# Patient Record
Sex: FEMALE | Race: WHITE | NOT HISPANIC OR LATINO | Employment: OTHER | URBAN - METROPOLITAN AREA
[De-identification: names, ages, dates, MRNs, and addresses within clinical notes are randomized per-mention and may not be internally consistent; named-entity substitution may affect disease eponyms.]

---

## 2017-07-07 ENCOUNTER — TRANSCRIBE ORDERS (OUTPATIENT)
Dept: LAB | Facility: CLINIC | Age: 67
End: 2017-07-07

## 2017-07-07 ENCOUNTER — APPOINTMENT (OUTPATIENT)
Dept: LAB | Facility: CLINIC | Age: 67
End: 2017-07-07
Payer: MEDICARE

## 2017-07-07 DIAGNOSIS — I10 ESSENTIAL HYPERTENSION, MALIGNANT: Primary | ICD-10-CM

## 2017-07-07 DIAGNOSIS — E11.65 UNCONTROLLED TYPE 2 DIABETES MELLITUS WITH HYPERGLYCEMIA, UNSPECIFIED LONG TERM INSULIN USE STATUS: ICD-10-CM

## 2017-07-07 DIAGNOSIS — I10 ESSENTIAL HYPERTENSION, MALIGNANT: ICD-10-CM

## 2017-07-07 LAB
ALBUMIN SERPL BCP-MCNC: 3 G/DL (ref 3.5–5)
ALP SERPL-CCNC: 142 U/L (ref 46–116)
ALT SERPL W P-5'-P-CCNC: 14 U/L (ref 12–78)
ANION GAP SERPL CALCULATED.3IONS-SCNC: 5 MMOL/L (ref 4–13)
AST SERPL W P-5'-P-CCNC: 8 U/L (ref 5–45)
BASOPHILS # BLD AUTO: 0.07 THOUSANDS/ΜL (ref 0–0.1)
BASOPHILS NFR BLD AUTO: 1 % (ref 0–1)
BILIRUB SERPL-MCNC: 0.28 MG/DL (ref 0.2–1)
BUN SERPL-MCNC: 31 MG/DL (ref 5–25)
CALCIUM SERPL-MCNC: 9.2 MG/DL (ref 8.3–10.1)
CHLORIDE SERPL-SCNC: 110 MMOL/L (ref 100–108)
CHOLEST SERPL-MCNC: 171 MG/DL (ref 50–200)
CO2 SERPL-SCNC: 24 MMOL/L (ref 21–32)
CREAT SERPL-MCNC: 1.53 MG/DL (ref 0.6–1.3)
CREAT UR-MCNC: 97.3 MG/DL
EOSINOPHIL # BLD AUTO: 0.87 THOUSAND/ΜL (ref 0–0.61)
EOSINOPHIL NFR BLD AUTO: 8 % (ref 0–6)
ERYTHROCYTE [DISTWIDTH] IN BLOOD BY AUTOMATED COUNT: 14.6 % (ref 11.6–15.1)
EST. AVERAGE GLUCOSE BLD GHB EST-MCNC: 203 MG/DL
GFR SERPL CREATININE-BSD FRML MDRD: 33.8 ML/MIN/1.73SQ M
GLUCOSE P FAST SERPL-MCNC: 107 MG/DL (ref 65–99)
HBA1C MFR BLD: 8.7 % (ref 4.2–6.3)
HCT VFR BLD AUTO: 33.6 % (ref 34.8–46.1)
HDLC SERPL-MCNC: 43 MG/DL (ref 40–60)
HGB BLD-MCNC: 10.7 G/DL (ref 11.5–15.4)
LDLC SERPL CALC-MCNC: 104 MG/DL (ref 0–100)
LYMPHOCYTES # BLD AUTO: 1.79 THOUSANDS/ΜL (ref 0.6–4.47)
LYMPHOCYTES NFR BLD AUTO: 16 % (ref 14–44)
MCH RBC QN AUTO: 29.2 PG (ref 26.8–34.3)
MCHC RBC AUTO-ENTMCNC: 31.8 G/DL (ref 31.4–37.4)
MCV RBC AUTO: 92 FL (ref 82–98)
MICROALBUMIN UR-MCNC: 1470 MG/L (ref 0–20)
MICROALBUMIN/CREAT 24H UR: 1511 MG/G CREATININE (ref 0–30)
MONOCYTES # BLD AUTO: 0.8 THOUSAND/ΜL (ref 0.17–1.22)
MONOCYTES NFR BLD AUTO: 7 % (ref 4–12)
NEUTROPHILS # BLD AUTO: 7.39 THOUSANDS/ΜL (ref 1.85–7.62)
NEUTS SEG NFR BLD AUTO: 68 % (ref 43–75)
NRBC BLD AUTO-RTO: 0 /100 WBCS
PLATELET # BLD AUTO: 358 THOUSANDS/UL (ref 149–390)
PMV BLD AUTO: 10 FL (ref 8.9–12.7)
POTASSIUM SERPL-SCNC: 4.8 MMOL/L (ref 3.5–5.3)
PROT SERPL-MCNC: 8.7 G/DL (ref 6.4–8.2)
RBC # BLD AUTO: 3.66 MILLION/UL (ref 3.81–5.12)
SODIUM SERPL-SCNC: 139 MMOL/L (ref 136–145)
TRIGL SERPL-MCNC: 122 MG/DL
TSH SERPL DL<=0.05 MIU/L-ACNC: 2.25 UIU/ML (ref 0.36–3.74)
WBC # BLD AUTO: 10.96 THOUSAND/UL (ref 4.31–10.16)

## 2017-07-07 PROCEDURE — 82043 UR ALBUMIN QUANTITATIVE: CPT

## 2017-07-07 PROCEDURE — 84443 ASSAY THYROID STIM HORMONE: CPT

## 2017-07-07 PROCEDURE — 83036 HEMOGLOBIN GLYCOSYLATED A1C: CPT

## 2017-07-07 PROCEDURE — 85025 COMPLETE CBC W/AUTO DIFF WBC: CPT

## 2017-07-07 PROCEDURE — 80053 COMPREHEN METABOLIC PANEL: CPT

## 2017-07-07 PROCEDURE — 82570 ASSAY OF URINE CREATININE: CPT

## 2017-07-07 PROCEDURE — 36415 COLL VENOUS BLD VENIPUNCTURE: CPT

## 2017-07-07 PROCEDURE — 80061 LIPID PANEL: CPT

## 2017-07-11 ENCOUNTER — APPOINTMENT (OUTPATIENT)
Dept: WOUND CARE | Facility: HOSPITAL | Age: 67
End: 2017-07-11
Payer: MEDICARE

## 2017-07-11 PROCEDURE — 97597 DBRDMT OPN WND 1ST 20 CM/<: CPT | Performed by: PREVENTIVE MEDICINE

## 2017-07-11 PROCEDURE — 97598 DBRDMT OPN WND ADDL 20CM/<: CPT | Performed by: PREVENTIVE MEDICINE

## 2017-07-11 PROCEDURE — 99204 OFFICE O/P NEW MOD 45 MIN: CPT | Performed by: PREVENTIVE MEDICINE

## 2017-07-13 ENCOUNTER — TRANSCRIBE ORDERS (OUTPATIENT)
Dept: ADMINISTRATIVE | Facility: HOSPITAL | Age: 67
End: 2017-07-13

## 2017-07-13 DIAGNOSIS — I87.309 CHRONIC VENOUS HYPERTENSION, UNSPECIFIED LATERALITY: Primary | ICD-10-CM

## 2017-07-20 ENCOUNTER — APPOINTMENT (OUTPATIENT)
Dept: WOUND CARE | Facility: HOSPITAL | Age: 67
End: 2017-07-20
Payer: MEDICARE

## 2017-07-20 PROCEDURE — 97598 DBRDMT OPN WND ADDL 20CM/<: CPT

## 2017-07-20 PROCEDURE — 97597 DBRDMT OPN WND 1ST 20 CM/<: CPT

## 2017-07-27 ENCOUNTER — HOSPITAL ENCOUNTER (OUTPATIENT)
Dept: NON INVASIVE DIAGNOSTICS | Facility: CLINIC | Age: 67
Discharge: HOME | End: 2017-07-27
Payer: MEDICARE

## 2017-07-27 DIAGNOSIS — I87.309 CHRONIC VENOUS HYPERTENSION, UNSPECIFIED LATERALITY: ICD-10-CM

## 2017-07-27 PROCEDURE — 93925 LOWER EXTREMITY STUDY: CPT

## 2017-07-27 PROCEDURE — 93970 EXTREMITY STUDY: CPT

## 2017-07-27 PROCEDURE — 93923 UPR/LXTR ART STDY 3+ LVLS: CPT

## 2017-08-03 ENCOUNTER — APPOINTMENT (OUTPATIENT)
Dept: WOUND CARE | Facility: HOSPITAL | Age: 67
End: 2017-08-03
Payer: MEDICARE

## 2017-08-03 PROCEDURE — 97597 DBRDMT OPN WND 1ST 20 CM/<: CPT | Performed by: PREVENTIVE MEDICINE

## 2017-08-03 PROCEDURE — 97598 DBRDMT OPN WND ADDL 20CM/<: CPT | Performed by: PREVENTIVE MEDICINE

## 2017-08-10 ENCOUNTER — APPOINTMENT (OUTPATIENT)
Dept: WOUND CARE | Facility: HOSPITAL | Age: 67
End: 2017-08-10
Payer: MEDICARE

## 2017-08-10 PROCEDURE — 97597 DBRDMT OPN WND 1ST 20 CM/<: CPT | Performed by: PREVENTIVE MEDICINE

## 2017-08-17 ENCOUNTER — APPOINTMENT (OUTPATIENT)
Dept: WOUND CARE | Facility: HOSPITAL | Age: 67
End: 2017-08-17
Payer: MEDICARE

## 2017-08-17 PROCEDURE — 97597 DBRDMT OPN WND 1ST 20 CM/<: CPT | Performed by: PREVENTIVE MEDICINE

## 2017-08-24 ENCOUNTER — APPOINTMENT (OUTPATIENT)
Dept: WOUND CARE | Facility: HOSPITAL | Age: 67
End: 2017-08-24
Payer: MEDICARE

## 2017-08-24 PROCEDURE — 29581 APPL MULTLAYER CMPRN SYS LEG: CPT

## 2017-08-31 ENCOUNTER — APPOINTMENT (OUTPATIENT)
Dept: WOUND CARE | Facility: HOSPITAL | Age: 67
End: 2017-08-31
Payer: MEDICARE

## 2017-08-31 PROCEDURE — 97597 DBRDMT OPN WND 1ST 20 CM/<: CPT

## 2017-09-07 ENCOUNTER — APPOINTMENT (OUTPATIENT)
Dept: WOUND CARE | Facility: HOSPITAL | Age: 67
End: 2017-09-07
Payer: MEDICARE

## 2017-09-07 PROCEDURE — 97597 DBRDMT OPN WND 1ST 20 CM/<: CPT | Performed by: PREVENTIVE MEDICINE

## 2017-09-14 ENCOUNTER — APPOINTMENT (OUTPATIENT)
Dept: WOUND CARE | Facility: HOSPITAL | Age: 67
End: 2017-09-14
Payer: MEDICARE

## 2017-09-14 PROCEDURE — 97597 DBRDMT OPN WND 1ST 20 CM/<: CPT | Performed by: PREVENTIVE MEDICINE

## 2017-09-21 ENCOUNTER — APPOINTMENT (OUTPATIENT)
Dept: WOUND CARE | Facility: HOSPITAL | Age: 67
End: 2017-09-21
Payer: MEDICARE

## 2017-09-21 PROCEDURE — 97597 DBRDMT OPN WND 1ST 20 CM/<: CPT | Performed by: PREVENTIVE MEDICINE

## 2017-09-28 ENCOUNTER — APPOINTMENT (OUTPATIENT)
Dept: WOUND CARE | Facility: HOSPITAL | Age: 67
End: 2017-09-28
Payer: MEDICARE

## 2017-09-28 PROCEDURE — 97597 DBRDMT OPN WND 1ST 20 CM/<: CPT | Performed by: PREVENTIVE MEDICINE

## 2017-10-06 ENCOUNTER — APPOINTMENT (OUTPATIENT)
Dept: WOUND CARE | Facility: HOSPITAL | Age: 67
End: 2017-10-06
Payer: MEDICARE

## 2017-10-06 PROCEDURE — 29581 APPL MULTLAYER CMPRN SYS LEG: CPT

## 2017-10-13 ENCOUNTER — APPOINTMENT (OUTPATIENT)
Dept: WOUND CARE | Facility: HOSPITAL | Age: 67
End: 2017-10-13
Payer: MEDICARE

## 2017-10-13 PROCEDURE — 29581 APPL MULTLAYER CMPRN SYS LEG: CPT | Performed by: PREVENTIVE MEDICINE

## 2017-10-19 ENCOUNTER — APPOINTMENT (OUTPATIENT)
Dept: WOUND CARE | Facility: HOSPITAL | Age: 67
End: 2017-10-19
Payer: MEDICARE

## 2017-10-19 PROCEDURE — 99212 OFFICE O/P EST SF 10 MIN: CPT | Performed by: PREVENTIVE MEDICINE

## 2018-04-24 ENCOUNTER — APPOINTMENT (OUTPATIENT)
Dept: LAB | Facility: CLINIC | Age: 68
End: 2018-04-24
Payer: MEDICARE

## 2018-04-24 ENCOUNTER — TRANSCRIBE ORDERS (OUTPATIENT)
Dept: LAB | Facility: CLINIC | Age: 68
End: 2018-04-24

## 2018-04-24 DIAGNOSIS — E11.8 DIABETIC COMPLICATION (HCC): ICD-10-CM

## 2018-04-24 DIAGNOSIS — I10 ESSENTIAL HYPERTENSION, MALIGNANT: Primary | ICD-10-CM

## 2018-04-24 DIAGNOSIS — E78.2 MIXED HYPERLIPIDEMIA: ICD-10-CM

## 2018-04-24 LAB
ALBUMIN SERPL BCP-MCNC: 3.2 G/DL (ref 3.5–5)
ALP SERPL-CCNC: 142 U/L (ref 46–116)
ALT SERPL W P-5'-P-CCNC: 13 U/L (ref 12–78)
ANION GAP SERPL CALCULATED.3IONS-SCNC: 8 MMOL/L (ref 4–13)
AST SERPL W P-5'-P-CCNC: 8 U/L (ref 5–45)
BASOPHILS # BLD AUTO: 0.09 THOUSANDS/ΜL (ref 0–0.1)
BASOPHILS NFR BLD AUTO: 1 % (ref 0–1)
BILIRUB SERPL-MCNC: 0.26 MG/DL (ref 0.2–1)
BUN SERPL-MCNC: 40 MG/DL (ref 5–25)
CALCIUM SERPL-MCNC: 9.5 MG/DL (ref 8.3–10.1)
CHLORIDE SERPL-SCNC: 111 MMOL/L (ref 100–108)
CHOLEST SERPL-MCNC: 208 MG/DL (ref 50–200)
CO2 SERPL-SCNC: 23 MMOL/L (ref 21–32)
CREAT SERPL-MCNC: 1.62 MG/DL (ref 0.6–1.3)
EOSINOPHIL # BLD AUTO: 1.08 THOUSAND/ΜL (ref 0–0.61)
EOSINOPHIL NFR BLD AUTO: 12 % (ref 0–6)
ERYTHROCYTE [DISTWIDTH] IN BLOOD BY AUTOMATED COUNT: 12.9 % (ref 11.6–15.1)
EST. AVERAGE GLUCOSE BLD GHB EST-MCNC: 197 MG/DL
GFR SERPL CREATININE-BSD FRML MDRD: 32 ML/MIN/1.73SQ M
GLUCOSE P FAST SERPL-MCNC: 118 MG/DL (ref 65–99)
HBA1C MFR BLD: 8.5 % (ref 4.2–6.3)
HCT VFR BLD AUTO: 37.5 % (ref 34.8–46.1)
HDLC SERPL-MCNC: 45 MG/DL (ref 40–60)
HGB BLD-MCNC: 11.4 G/DL (ref 11.5–15.4)
LDLC SERPL CALC-MCNC: 134 MG/DL (ref 0–100)
LYMPHOCYTES # BLD AUTO: 1.78 THOUSANDS/ΜL (ref 0.6–4.47)
LYMPHOCYTES NFR BLD AUTO: 20 % (ref 14–44)
MCH RBC QN AUTO: 29.5 PG (ref 26.8–34.3)
MCHC RBC AUTO-ENTMCNC: 30.4 G/DL (ref 31.4–37.4)
MCV RBC AUTO: 97 FL (ref 82–98)
MONOCYTES # BLD AUTO: 0.64 THOUSAND/ΜL (ref 0.17–1.22)
MONOCYTES NFR BLD AUTO: 7 % (ref 4–12)
NEUTROPHILS # BLD AUTO: 5.21 THOUSANDS/ΜL (ref 1.85–7.62)
NEUTS SEG NFR BLD AUTO: 60 % (ref 43–75)
NONHDLC SERPL-MCNC: 163 MG/DL
NRBC BLD AUTO-RTO: 0 /100 WBCS
PLATELET # BLD AUTO: 344 THOUSANDS/UL (ref 149–390)
PMV BLD AUTO: 10.3 FL (ref 8.9–12.7)
POTASSIUM SERPL-SCNC: 5.2 MMOL/L (ref 3.5–5.3)
PROT SERPL-MCNC: 9 G/DL (ref 6.4–8.2)
RBC # BLD AUTO: 3.86 MILLION/UL (ref 3.81–5.12)
SODIUM SERPL-SCNC: 142 MMOL/L (ref 136–145)
TRIGL SERPL-MCNC: 143 MG/DL
WBC # BLD AUTO: 8.87 THOUSAND/UL (ref 4.31–10.16)

## 2018-04-24 PROCEDURE — 36415 COLL VENOUS BLD VENIPUNCTURE: CPT

## 2018-04-24 PROCEDURE — 85025 COMPLETE CBC W/AUTO DIFF WBC: CPT

## 2018-04-24 PROCEDURE — 80053 COMPREHEN METABOLIC PANEL: CPT

## 2018-04-24 PROCEDURE — 83036 HEMOGLOBIN GLYCOSYLATED A1C: CPT

## 2018-04-24 PROCEDURE — 80061 LIPID PANEL: CPT

## 2018-06-28 ENCOUNTER — TRANSCRIBE ORDERS (OUTPATIENT)
Dept: LAB | Facility: CLINIC | Age: 68
End: 2018-06-28

## 2018-06-28 ENCOUNTER — APPOINTMENT (OUTPATIENT)
Dept: LAB | Facility: CLINIC | Age: 68
End: 2018-06-28
Payer: MEDICARE

## 2018-06-28 DIAGNOSIS — N18.4 CHRONIC KIDNEY DISEASE, STAGE IV (SEVERE) (HCC): Primary | ICD-10-CM

## 2018-06-28 LAB
ANION GAP SERPL CALCULATED.3IONS-SCNC: 9 MMOL/L (ref 4–13)
BUN SERPL-MCNC: 66 MG/DL (ref 5–25)
CALCIUM SERPL-MCNC: 9.4 MG/DL (ref 8.3–10.1)
CHLORIDE SERPL-SCNC: 104 MMOL/L (ref 100–108)
CO2 SERPL-SCNC: 24 MMOL/L (ref 21–32)
CREAT SERPL-MCNC: 2.73 MG/DL (ref 0.6–1.3)
GFR SERPL CREATININE-BSD FRML MDRD: 17 ML/MIN/1.73SQ M
GLUCOSE P FAST SERPL-MCNC: 232 MG/DL (ref 65–99)
POTASSIUM SERPL-SCNC: 4.9 MMOL/L (ref 3.5–5.3)
SODIUM SERPL-SCNC: 137 MMOL/L (ref 136–145)

## 2018-06-28 PROCEDURE — 36415 COLL VENOUS BLD VENIPUNCTURE: CPT

## 2018-06-28 PROCEDURE — 80048 BASIC METABOLIC PNL TOTAL CA: CPT

## 2018-07-06 ENCOUNTER — APPOINTMENT (OUTPATIENT)
Dept: LAB | Facility: CLINIC | Age: 68
End: 2018-07-06
Payer: MEDICARE

## 2018-07-06 ENCOUNTER — TRANSCRIBE ORDERS (OUTPATIENT)
Dept: LAB | Facility: CLINIC | Age: 68
End: 2018-07-06

## 2018-07-06 DIAGNOSIS — E78.5 HYPERLIPIDEMIA, UNSPECIFIED HYPERLIPIDEMIA TYPE: ICD-10-CM

## 2018-07-06 DIAGNOSIS — Z95.810 AICD (AUTOMATIC CARDIOVERTER/DEFIBRILLATOR) PRESENT: ICD-10-CM

## 2018-07-06 DIAGNOSIS — N18.4 CKD (CHRONIC KIDNEY DISEASE) STAGE 4, GFR 15-29 ML/MIN (HCC): ICD-10-CM

## 2018-07-06 DIAGNOSIS — I10 ESSENTIAL HYPERTENSION: ICD-10-CM

## 2018-07-06 DIAGNOSIS — I25.10 CORONARY ARTERY DISEASE INVOLVING NATIVE CORONARY ARTERY OF NATIVE HEART WITHOUT ANGINA PECTORIS: Primary | ICD-10-CM

## 2018-07-06 DIAGNOSIS — Z95.1 S/P CABG (CORONARY ARTERY BYPASS GRAFT): ICD-10-CM

## 2018-07-06 LAB
ANION GAP SERPL CALCULATED.3IONS-SCNC: 10 MMOL/L (ref 4–13)
BUN SERPL-MCNC: 57 MG/DL (ref 5–25)
CALCIUM SERPL-MCNC: 8.9 MG/DL (ref 8.3–10.1)
CHLORIDE SERPL-SCNC: 109 MMOL/L (ref 100–108)
CO2 SERPL-SCNC: 18 MMOL/L (ref 21–32)
CREAT SERPL-MCNC: 2.23 MG/DL (ref 0.6–1.3)
GFR SERPL CREATININE-BSD FRML MDRD: 22 ML/MIN/1.73SQ M
GLUCOSE P FAST SERPL-MCNC: 158 MG/DL (ref 65–99)
POTASSIUM SERPL-SCNC: 4.5 MMOL/L (ref 3.5–5.3)
SODIUM SERPL-SCNC: 137 MMOL/L (ref 136–145)

## 2018-07-06 PROCEDURE — 80048 BASIC METABOLIC PNL TOTAL CA: CPT

## 2018-07-06 PROCEDURE — 36415 COLL VENOUS BLD VENIPUNCTURE: CPT

## 2018-08-31 ENCOUNTER — APPOINTMENT (OUTPATIENT)
Dept: LAB | Facility: CLINIC | Age: 68
End: 2018-08-31
Payer: MEDICARE

## 2018-08-31 ENCOUNTER — TRANSCRIBE ORDERS (OUTPATIENT)
Dept: LAB | Facility: CLINIC | Age: 68
End: 2018-08-31

## 2018-08-31 DIAGNOSIS — N18.4 CHRONIC KIDNEY DISEASE, STAGE IV (SEVERE) (HCC): Primary | ICD-10-CM

## 2018-08-31 DIAGNOSIS — N18.4 CHRONIC KIDNEY DISEASE, STAGE IV (SEVERE) (HCC): ICD-10-CM

## 2018-08-31 DIAGNOSIS — I50.33 ACUTE ON CHRONIC DIASTOLIC HEART FAILURE (HCC): ICD-10-CM

## 2018-08-31 LAB
ANION GAP SERPL CALCULATED.3IONS-SCNC: 9 MMOL/L (ref 4–13)
BUN SERPL-MCNC: 65 MG/DL (ref 5–25)
CALCIUM SERPL-MCNC: 9.3 MG/DL (ref 8.3–10.1)
CHLORIDE SERPL-SCNC: 104 MMOL/L (ref 100–108)
CO2 SERPL-SCNC: 26 MMOL/L (ref 21–32)
CREAT SERPL-MCNC: 2.58 MG/DL (ref 0.6–1.3)
GFR SERPL CREATININE-BSD FRML MDRD: 18 ML/MIN/1.73SQ M
GLUCOSE P FAST SERPL-MCNC: 163 MG/DL (ref 65–99)
POTASSIUM SERPL-SCNC: 3.5 MMOL/L (ref 3.5–5.3)
SODIUM SERPL-SCNC: 139 MMOL/L (ref 136–145)

## 2018-08-31 PROCEDURE — 80048 BASIC METABOLIC PNL TOTAL CA: CPT

## 2018-08-31 PROCEDURE — 36415 COLL VENOUS BLD VENIPUNCTURE: CPT

## 2019-04-22 RX ORDER — CARVEDILOL 25 MG/1
TABLET ORAL
Qty: 60 TABLET | Refills: 2 | OUTPATIENT
Start: 2019-04-22

## 2020-07-03 ENCOUNTER — LAB REQUISITION (OUTPATIENT)
Dept: LAB | Facility: HOSPITAL | Age: 70
End: 2020-07-03
Payer: MEDICARE

## 2020-07-03 DIAGNOSIS — N18.9 CHRONIC KIDNEY DISEASE, UNSPECIFIED: ICD-10-CM

## 2020-07-03 DIAGNOSIS — R60.9 EDEMA, UNSPECIFIED: ICD-10-CM

## 2020-07-03 LAB
ANION GAP SERPL CALCULATED.3IONS-SCNC: 9 MMOL/L (ref 4–13)
BUN SERPL-MCNC: 55 MG/DL (ref 5–25)
CALCIUM SERPL-MCNC: 8.9 MG/DL (ref 8.3–10.1)
CHLORIDE SERPL-SCNC: 113 MMOL/L (ref 100–108)
CO2 SERPL-SCNC: 24 MMOL/L (ref 21–32)
CREAT SERPL-MCNC: 1.82 MG/DL (ref 0.6–1.3)
GFR SERPL CREATININE-BSD FRML MDRD: 28 ML/MIN/1.73SQ M
GLUCOSE SERPL-MCNC: 56 MG/DL (ref 65–140)
POTASSIUM SERPL-SCNC: 4 MMOL/L (ref 3.5–5.3)
SODIUM SERPL-SCNC: 146 MMOL/L (ref 136–145)

## 2020-07-03 PROCEDURE — 80048 BASIC METABOLIC PNL TOTAL CA: CPT | Performed by: NURSE PRACTITIONER

## 2020-07-10 ENCOUNTER — LAB REQUISITION (OUTPATIENT)
Dept: LAB | Facility: HOSPITAL | Age: 70
End: 2020-07-10
Payer: MEDICARE

## 2020-07-10 DIAGNOSIS — D64.9 ANEMIA, UNSPECIFIED: ICD-10-CM

## 2020-07-10 DIAGNOSIS — E11.9 TYPE 2 DIABETES MELLITUS WITHOUT COMPLICATIONS (HCC): ICD-10-CM

## 2020-07-10 LAB
HCT VFR BLD AUTO: 26.9 % (ref 34.8–46.1)
HGB BLD-MCNC: 8 G/DL (ref 11.5–15.4)

## 2020-07-10 PROCEDURE — 85014 HEMATOCRIT: CPT | Performed by: INTERNAL MEDICINE

## 2020-07-10 PROCEDURE — 85018 HEMOGLOBIN: CPT | Performed by: INTERNAL MEDICINE

## 2021-01-29 ENCOUNTER — TRANSITIONAL CARE MANAGEMENT (OUTPATIENT)
Dept: INTERNAL MEDICINE CLINIC | Facility: CLINIC | Age: 71
End: 2021-01-29

## 2021-10-26 ENCOUNTER — CONSULT (OUTPATIENT)
Dept: DERMATOLOGY | Facility: CLINIC | Age: 71
End: 2021-10-26
Payer: MEDICARE

## 2021-10-26 VITALS — BODY MASS INDEX: 38.53 KG/M2 | TEMPERATURE: 98.2 F | HEIGHT: 67 IN

## 2021-10-26 DIAGNOSIS — I83.12 LIPODERMATOSCLEROSIS OF BOTH LOWER EXTREMITIES: Primary | ICD-10-CM

## 2021-10-26 DIAGNOSIS — I83.11 LIPODERMATOSCLEROSIS OF BOTH LOWER EXTREMITIES: Primary | ICD-10-CM

## 2021-10-26 DIAGNOSIS — B35.3 TINEA PEDIS OF BOTH FEET: ICD-10-CM

## 2021-10-26 PROCEDURE — 99203 OFFICE O/P NEW LOW 30 MIN: CPT | Performed by: DERMATOLOGY

## 2021-10-26 RX ORDER — AMLODIPINE BESYLATE 5 MG/1
5 TABLET ORAL DAILY
COMMUNITY

## 2021-10-26 RX ORDER — ACETAMINOPHEN 325 MG/1
650 TABLET ORAL EVERY 4 HOURS PRN
COMMUNITY
Start: 2021-07-08

## 2021-10-26 RX ORDER — ALLOPURINOL 100 MG/1
1 TABLET ORAL DAILY
COMMUNITY

## 2021-10-26 RX ORDER — PENTOXIFYLLINE 400 MG/1
400 TABLET, EXTENDED RELEASE ORAL DAILY
Qty: 90 TABLET | Refills: 0 | Status: SHIPPED | OUTPATIENT
Start: 2021-10-26

## 2021-10-26 RX ORDER — KETOCONAZOLE 20 MG/G
CREAM TOPICAL 2 TIMES DAILY
Qty: 15 G | Refills: 3 | Status: SHIPPED | OUTPATIENT
Start: 2021-10-26

## 2021-10-26 RX ORDER — ATORVASTATIN CALCIUM 80 MG/1
1 TABLET, FILM COATED ORAL
COMMUNITY
Start: 2013-12-04

## 2022-02-15 ENCOUNTER — TELEPHONE (OUTPATIENT)
Dept: DERMATOLOGY | Facility: CLINIC | Age: 72
End: 2022-02-15

## 2022-02-15 ENCOUNTER — OFFICE VISIT (OUTPATIENT)
Dept: DERMATOLOGY | Facility: CLINIC | Age: 72
End: 2022-02-15
Payer: MEDICARE

## 2022-02-15 VITALS — HEIGHT: 66 IN | WEIGHT: 261 LBS | BODY MASS INDEX: 41.95 KG/M2 | TEMPERATURE: 97.8 F

## 2022-02-15 DIAGNOSIS — I83.11 LIPODERMATOSCLEROSIS OF RIGHT LOWER EXTREMITY: ICD-10-CM

## 2022-02-15 DIAGNOSIS — B35.3 TINEA PEDIS, UNSPECIFIED LATERALITY: Primary | ICD-10-CM

## 2022-02-15 PROCEDURE — 99213 OFFICE O/P EST LOW 20 MIN: CPT | Performed by: DERMATOLOGY

## 2022-02-15 NOTE — TELEPHONE ENCOUNTER
We only added on triamcinolone ointment twice a day to affected area  Should continue wound care as before  I will give a call

## 2022-02-15 NOTE — PROGRESS NOTES
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:  Follow up to LIPODERMATOSCLEROSIS     Concern #2:  Follow up to [address]")      Current Medications:    Current Outpatient Medications:     acetaminophen (TYLENOL) 325 mg tablet, Take 650 mg by mouth every 4 (four) hours as needed, Disp: , Rfl:     ketoconazole (NIZORAL) 2 % cream, Apply topically 2 (two) times a day to scaly areas on feet and between toes until area clears  , Disp: 15 g, Rfl: 3    allopurinol (ZYLOPRIM) 100 mg tablet, Take 1 tablet by mouth daily, Disp: , Rfl:     amLODIPine (NORVASC) 5 mg tablet, Take 5 mg by mouth daily, Disp: , Rfl:     Aspirin Buf,CaCarb-MgCarb-MgO, 81 MG TABS, Take 1 tablet by mouth daily, Disp: , Rfl:     atorvastatin (LIPITOR) 80 mg tablet, Take 1 tablet by mouth, Disp: , Rfl:     pentoxifylline (TRENtal) 400 mg ER tablet, Take 1 tablet (400 mg total) by mouth daily, Disp: 90 tablet, Rfl: 0    CONSTITUTIONAL:   Vitals:    02/15/22 1020   Temp: 97 8 °F (36 6 °C)   TempSrc: Temporal   Weight: 118 kg (261 lb)   Height: 5' 6" (1 676 m)     Specific Alerts:    Have you been seen by a [facility]atologist in the last 3 years? YES    Are you pregnant or planning to become pregnant? No    Are you currently or planning to be nursing or breast feeding? No    Allergies   Allergen Reactions    Adhesive [Medical Tape] Itching       May we call your Preferred Phone number to discuss your specific medical information? YES    May we leave a detailed message that includes your specific medical information? YES    Have you traveled outside of the [facility] in the past 3 months? No    Do you currently have a pacemaker or defibrillator? YES    Do you have any artificial heart valves, joints, plates, screws, rods, stents, pins, etc? No   - If Yes, were any placed within the last 2 years?     Do you require any medications prior to a surgical procedure? No     Are you taking any medications that cause you to bleed more easily ("blood thinners") No    Have you ever experienced a rapid heartbeat with epinephrine? No    Have you ever been treated with "gold" (gold sodium thiomalate) therapy? No    [facility] can help with wrinkles, "laugh lines," facial volume loss, "double chin," "love handles," age spots, and more  Are you interested in learning today about some of the skin enhancement procedures that we offer? (If Yes, please provide more detail) No    Review of Systems:  Have you recently had or currently have any of the following? · Fever or chills: No  · Night Sweats: No  · Headaches: No  · Weight Gain: No  · Weight Loss: No  · Blurry Vision: No  · Nausea: No  · Vomiting: No  · Diarrhea: No  · Blood in Stool: No  · Abdominal Pain: No  · Itchy Skin: YES  · Painful Joints: YES  · Swollen Joints: YES  · Muscle Pain: No  · Irregular Mole: No  · Sun Burn: YES  · Dry Skin: No  · Skin Color Changes: No  · Scar or Keloid: YES  · Cold Sores/Fever Blisters: No  · Bacterial Infections/MRSA: No  · Anxiety: YES  · Depression: YES  · Suicidal or Homicidal Thoughts: No      PSYCH: Normal mood and affect  EYES: Normal conjunctiva  ENT: Normal lips and oral mucosa  CARDIOVASCULAR: No edema  RESPIRATORY: Normal respirations  HEME/LYMPH/IMMUNO:  No regional lymphadenopathy except as noted below in ASSESSMENT AND PLAN BY DIAGNOSIS    FOCUSED ORGAN SYSTEM SKIN EXAM (SKIN)   Right Leg, Foot, Toes Normal except as noted below in Assessment   Left Leg, Foot, Toes Normal except as noted below in Assessment        LIPODERMATOSCLEROSIS      Physical Exam:  · Anatomic Location Affected:  Bilateral ankles to the knees  · Morphological Description:  Ankle to the knee: Underrated light brown/pink patches with few shallow ulcers on the right leg     · Pertinent Positives: Champagne bottle deformity   · Pertinent Negatives:         Additional History of Present Condition:  Patient states this skin condition started over a month ago  Started on her left lower leg first  Started as a blister, redness  She lives at [address]  Patient has hx of type 2 diabetes , venous insufficiency, venous trombosis and embolism presents with one month hx of painful lower legs associated with rash  She is on Betamethasone Dipropinate, 0 05% cream, mupirocin ointment and Dakins solution with wraps that are not getting better  Patient has completed 3 month supply of trental and has noted improvement with compression stockings and wound care, who she sees every 2 weeks  Notes improvement of scale on feet from ketoconazole      Assessment and Plan:  Based on a thorough discussion of this condition and the management approach to it (including a comprehensive discussion of the known risks, side effects and potential benefits of treatment), the patient (family) agrees to implement the following specific plan:  ·  Trental 400 mg once daily due to renal disease; patient has completed 90 days  At this time, recommend continuing with trental   · Start triamcincolone 0 1% ointment to apply twice daily to right lower extremity  · Continue with wound care  Wound care cleanse Bilateral lower extremity with NSS or wound cleanser - consider UNNA Boot dressings  · Continue compression stocking and elevation  · Dressing removed and re-applied in office today  · Follow up in 2-3 months         TINEA PEDIS ("ATHLETE'S FOOT")     Physical Exam:  · Anatomic Location Affected:  Bilateral feet   · Morphological Description:  significant improvement; mild scale  · Pertinent Positives:  · Pertinent Negatives:      Assessment and Plan:  Based on a thorough discussion of this condition and the management approach to it (including a comprehensive discussion of the known risks, side effects and potential benefits of treatment), the patient (family) agrees to implement the following specific plan:    -- continue with Ketoconazole 2% cream twice daily to scaly areas on feet and between toes until area clears      Tinea Pedis  Tinea pedis is a fungal infection of the foot and is in fact the most common fungal infection  Tinea pedis is caused by dermatophyte fungi with the three most common being Trichophyton (T ) rubrum, T  interdigitale and Epidermophyton floccosum      Tinea pedis most commonly involves the interdigital spaces, known as "athlete's foot " Other typical sites include the toenails, groin, and palms of the hands  There are four major manifestations of tinea pedis including chronic hyperkeratotic, chronic intertriginous, acute ulcerative and vesicobullous  Signs and symptoms include:   · Itchiness, redness, and scaling between the toes  · Scales covering the soles and sides of the feet  · Blisters over the inner aspect of the feet     It is particularly common in hot, tropical, and urban environments where sweating in the feet facilitate fungal growth  Risk factors for development include:  · Occlusive footwear  · Excessive swearing  · Diabetes or other underlying immunosuppression   · Poor peripheral circulation      The diagnosis of tinea pedis can usually be made via good history and physical exam due to its characteristic clinical features  Diagnosis can be confirmed by examining skin scrapings under the microscope  Cultures are occasionally done but may not be necessary if fungi are seen under microscopy       Other diagnoses to consider if patients do not respond to therapy include psoriasis, contact dermatitis, and eczema      Tinea pedis can be treated with topical antifungal drugs applied to affected areas on a repeated basis (usually 2 twice a day) for 2 to 4 weeks  Common topical medications include topical ketoconazole, allylamines, butenafine, ciclopirox, and tolnaftate    In cases that do not respond to topical therapy, oral antifungal agents may be used which include terbinafine, itraconazole, fluconazole and griseofulvin  These oral agents are also used to treat tinea capitis (fungal infection of the scalp) and onychomycosis (fungal infection of the nails)  Those with pre-existing liver problems are usually screened for liver function prior to starting oral terbinafine       Tinea pedis can be prevented by making sure feet are clean and dry with protective footwear worn in communal facilities   Other recommendations are:  · Using drying foot powders when wearing occlusive shoes   · Thoroughly dry shoes and boots prior to wearing them   · Making sure to clean contaminated bathroom floors with bleach   · Treatment of family members and other close contacts      Campos Randall MD  Scribe Attestation    I,:  Gladis Perla am acting as a scribe while in the presence of the attending physician :       I,:  Ruthy Ozuna MD personally performed the services described in this documentation    as scribed in my presence :

## 2022-02-15 NOTE — PATIENT INSTRUCTIONS
LIPODERMATOSCLEROSIS     Assessment and Plan:  Based on a thorough discussion of this condition and the management approach to it (including a comprehensive discussion of the known risks, side effects and potential benefits of treatment), the patient (family) agrees to implement the following specific plan:  ·  Trental 400 mg once daily due to renal disease; patient has completed 90 days  At this time, recommend continuing with trental   · Start triamcincolone 0 1% ointment to apply twice daily to right lower extremity  · Continue with wound care  Wound care cleanse Bilateral lower extremity with NSS or wound cleanser - consider UNNA Boot dressings  · Continue compression stocking and elevation  · Dressing removed and re-applied in office today  · Follow up in 3 months  TINEA PEDIS ("ATHLETE'S FOOT")      Assessment and Plan:  Based on a thorough discussion of this condition and the management approach to it (including a comprehensive discussion of the known risks, side effects and potential benefits of treatment), the patient (family) agrees to implement the following specific plan:    -- continue with Ketoconazole 2% cream twice daily to scaly areas on feet and between toes until area clears      Tinea Pedis  Tinea pedis is a fungal infection of the foot and is in fact the most common fungal infection  Tinea pedis is caused by dermatophyte fungi with the three most common being Trichophyton (T ) rubrum, T  interdigitale and Epidermophyton floccosum      Tinea pedis most commonly involves the interdigital spaces, known as "athlete's foot " Other typical sites include the toenails, groin, and palms of the hands  There are four major manifestations of tinea pedis including chronic hyperkeratotic, chronic intertriginous, acute ulcerative and vesicobullous   Signs and symptoms include:   · Itchiness, redness, and scaling between the toes  · Scales covering the soles and sides of the feet  · Blisters over the inner aspect of the feet     It is particularly common in hot, tropical, and urban environments where sweating in the feet facilitate fungal growth  Risk factors for development include:  · Occlusive footwear  · Excessive swearing  · Diabetes or other underlying immunosuppression   · Poor peripheral circulation      The diagnosis of tinea pedis can usually be made via good history and physical exam due to its characteristic clinical features  Diagnosis can be confirmed by examining skin scrapings under the microscope  Cultures are occasionally done but may not be necessary if fungi are seen under microscopy       Other diagnoses to consider if patients do not respond to therapy include psoriasis, contact dermatitis, and eczema      Tinea pedis can be treated with topical antifungal drugs applied to affected areas on a repeated basis (usually 2 twice a day) for 2 to 4 weeks  Common topical medications include topical ketoconazole, allylamines, butenafine, ciclopirox, and tolnaftate  In cases that do not respond to topical therapy, oral antifungal agents may be used which include terbinafine, itraconazole, fluconazole and griseofulvin  These oral agents are also used to treat tinea capitis (fungal infection of the scalp) and onychomycosis (fungal infection of the nails)  Those with pre-existing liver problems are usually screened for liver function prior to starting oral terbinafine       Tinea pedis can be prevented by making sure feet are clean and dry with protective footwear worn in communal facilities   Other recommendations are:  · Using drying foot powders when wearing occlusive shoes   · Thoroughly dry shoes and boots prior to wearing them   · Making sure to clean contaminated bathroom floors with bleach   · Treatment of family members and other close contacts

## 2022-02-15 NOTE — TELEPHONE ENCOUNTER
Voicemail received from Josi Fairchild, nurse at [facility], requesting clarification on starting triamcinolone  Josi Fairchild asking if triamcinolone was prescribed to replace current topical wound treatment medications  Requesting a return call  I read summary but it does not clearly state if stopping current topical medication or not

## 2022-05-05 ENCOUNTER — OFFICE VISIT (OUTPATIENT)
Dept: DERMATOLOGY | Facility: CLINIC | Age: 72
End: 2022-05-05
Payer: MEDICARE

## 2022-05-05 VITALS — BODY MASS INDEX: 41.81 KG/M2 | WEIGHT: 260.14 LBS | TEMPERATURE: 97.9 F | HEIGHT: 66 IN

## 2022-05-05 DIAGNOSIS — I83.11 LIPODERMATOSCLEROSIS OF BOTH LOWER EXTREMITIES: Primary | ICD-10-CM

## 2022-05-05 DIAGNOSIS — I83.12 LIPODERMATOSCLEROSIS OF BOTH LOWER EXTREMITIES: Primary | ICD-10-CM

## 2022-05-05 PROCEDURE — 99213 OFFICE O/P EST LOW 20 MIN: CPT | Performed by: DERMATOLOGY

## 2022-05-05 NOTE — PROGRESS NOTES
[facility] Follow Up Note    Patient Name: Catherine Moffett  Encounter Date: 5/5/2022    Today's Chief Concerns:  Chad Rodriguez Concern #1:  Follow up     Current Medications:    Current Outpatient Medications:     acetaminophen (TYLENOL) 325 mg tablet, Take 650 mg by mouth every 4 (four) hours as needed, Disp: , Rfl:     allopurinol (ZYLOPRIM) 100 mg tablet, Take 1 tablet by mouth daily, Disp: , Rfl:     amLODIPine (NORVASC) 5 mg tablet, Take 5 mg by mouth daily, Disp: , Rfl:     Aspirin Buf,CaCarb-MgCarb-MgO, 81 MG TABS, Take 1 tablet by mouth daily, Disp: , Rfl:     atorvastatin (LIPITOR) 80 mg tablet, Take 1 tablet by mouth, Disp: , Rfl:     ketoconazole (NIZORAL) 2 % cream, Apply topically 2 (two) times a day to scaly areas on feet and between toes until area clears  , Disp: 15 g, Rfl: 3    pentoxifylline (TRENtal) 400 mg ER tablet, Take 1 tablet (400 mg total) by mouth daily, Disp: 90 tablet, Rfl: 0    triamcinolone (KENALOG) 0 1 % ointment, Apply topically 2 (two) times a day Apply to affected area on right leg twice daily for up to 14 days at a time  Then take at least a one-week break before resuming for flares  , Disp: 80 g, Rfl: 0    CONSTITUTIONAL:   Vitals:    05/05/22 1232   Temp: 97 9 °F (36 6 °C)   TempSrc: Temporal   Weight: 118 kg (260 lb 2 3 oz)   Height: 5' 6" (1 676 m)       Specific Alerts:    Have you been seen by a [facility] D[facility]atologist in the last 3 years? YES    Are you pregnant or planning to become pregnant? No    Are you currently or planning to be nursing or breast feeding? No    Allergies   Allergen Reactions    Adhesive [Medical Tape] Itching       May we call your Preferred Phone number to discuss your specific medical information? YES    May we leave a detailed message that includes your specific medical information? YES    Have you traveled outside of the [facility] in the past 3 months? No    Do you currently have a pacemaker or defibrillator?  YES - Both    Do you have any artificial heart valves, joints, plates, screws, rods, stents, pins, etc? No   - If Yes, were any placed within the last 2 years? Do you require any medications prior to a surgical procedure? No    Are you taking any medications that cause you to bleed more easily ("blood thinners") YES    Have you ever experienced a rapid heartbeat with epinephrine? No    Have you ever been treated with "gold" (gold sodium thiomalate) therapy? No    [address]  D[facility]atology can help with wrinkles, "laugh lines," facial volume loss, "double chin," "love handles," age spots, and more  Are you interested in learning today about some of the skin enhancement procedures that we offer? (If Yes, please provide more detail) No    Review of Systems:  Have you recently had or currently have any of the following? · Fever or chills: No  · Night Sweats: No  · Headaches: No  · Weight Gain: No  · Weight Loss: No  · Blurry Vision: No  · Nausea: No  · Vomiting: No  · Diarrhea: No  · Blood in Stool: No  · Abdominal Pain: No  · Itchy Skin: YES  · Painful Joints: YES  · Swollen Joints: YES  · Muscle Pain: No  · Irregular Mole: No  · Sun Burn: No  · Dry Skin: YES  · Skin Color Changes: No  · Scar or Keloid: No  · Cold Sores/Fever Blisters: No  · Bacterial Infections/MRSA: No  · Anxiety: YES  · Depression: YES  · Suicidal or Homicidal Thoughts: No      PSYCH: Normal mood and affect  EYES: Normal conjunctiva  ENT: Normal lips and oral mucosa  CARDIOVASCULAR: Significant LE edema  RESPIRATORY: Normal respirations      1  LIPO-DERMATOSCLEROSIS     Physical Exam:   Anatomic Location Affected: Ankles to the knees   Morphological Description:      Additional History of Present Condition:  Patient stated that her leg wounds are getting better on both legs  Patient states the triamcinolone ointment works very well to help with the wounds and noticed a difference in healing   Patient noted that wound care does come to the nursing home daily to cleanse both legs, and apply a new dressing/bandage on both legs  Assessment and Plan:  Based on a thorough discussion of this condition and the management approach to it (including a comprehensive discussion of the known risks, side effects and potential benefits of treatment), the patient (family) agrees to implement the following specific plan:   Patient notes significant improvement   Continue wound care treatment daily  Wound care cleanse legs with NSS or warm water and soap  Continue with Unna-boot dressings  Make sure to have wound care take pictures and send them through [facility] for the dermatologist (Dr Jessie Mares) can reference back to see if the legs are getting better or worse  Continue applying triamcinolone 0 1% ointment to both legs daily  This is a steroid ointment  Do not exceed more than 14 days straight  If needed longer than 14 days than take a 1 week break before re-applying to the skin  This gives the skin a break, and lessens the chance of skin thinning   Continue compression stocking and elevation   Dressing was not removed today in office  Patient stated wound care just changed the bandage before coming to the appointment   Follow up in 3 months only if needed; continue follup-up with wound care  Patient's nursing home will call to schedule appointment  By the companies protocol someone from the nursing home has to call to make a follow up appointment, can't make it at the appointment         Scribe Attestation    I,:  Terrie Hernandez am acting as a scribe while in the presence of the attending physician :       I,:  Jeancarlos Tavera MD personally performed the services described in this documentation    as scribed in my presence :

## 2022-05-05 NOTE — PATIENT INSTRUCTIONS
LIPODERMATOSCLEROSIS    Assessment and Plan:  Based on a thorough discussion of this condition and the management approach to it (including a comprehensive discussion of the known risks, side effects and potential benefits of treatment), the patient (family) agrees to implement the following specific plan:   Continue wound care treatment daily  Wound care cleanse bilateral lower legs with NSS or warm water and soap  Continue with UNNA boot dressings  Make sure to have wound care take pictures and send them through [facility] for the dermatologist (Dr Junie Dakins) can reference back to see if the legs are getting better or worse  Continue applying Triamcinolone 0 1% ointment to both legs daily  This is a steroid ointment  Do not exceed more than 14 days straight  If needed longer than 14 days than take a 1 week break before re-applying to the skin  This gives the skin a break, and lessens the chance of skin thinning   Continue compression stocking and elevation   Dressing was not removed today in office  Patient stated wound care just changed the bandage before coming to the appointment   Follow up in 3 months

## 2022-11-02 ENCOUNTER — OFFICE VISIT (OUTPATIENT)
Dept: DERMATOLOGY | Facility: CLINIC | Age: 72
End: 2022-11-02

## 2022-11-02 VITALS — TEMPERATURE: 97.5 F | HEIGHT: 66 IN | BODY MASS INDEX: 41.99 KG/M2

## 2022-11-02 DIAGNOSIS — I83.11 LIPODERMATOSCLEROSIS OF BOTH LOWER EXTREMITIES: ICD-10-CM

## 2022-11-02 DIAGNOSIS — I83.12 LIPODERMATOSCLEROSIS OF BOTH LOWER EXTREMITIES: ICD-10-CM

## 2022-11-02 DIAGNOSIS — B35.3 TINEA PEDIS, UNSPECIFIED LATERALITY: ICD-10-CM

## 2022-11-02 RX ORDER — PENTOXIFYLLINE 400 MG/1
400 TABLET, EXTENDED RELEASE ORAL 2 TIMES DAILY
Qty: 90 TABLET | Refills: 0 | Status: SHIPPED | OUTPATIENT
Start: 2022-11-02

## 2022-11-02 NOTE — PATIENT INSTRUCTIONS
LIPODERMATOSCLEROSIS (MUCH IMPROVED)        Assessment and Plan:  Based on a thorough discussion of this condition and the management approach to it (including a comprehensive discussion of the known risks, side effects and potential benefits of treatment), the patient (family) agrees to implement the following specific plan:  Continue with Pentoxifylline 400 mg 2x/day  Apply triamcinolone 0 1% ointment 2x/day to legs as needed for rash, itch  Continue compression stockings and elevate  Follow up 4-5 months with Dr Awa Bowman ("[id number]") +/- eczema      Assessment and Plan:  Based on a thorough discussion of this condition and the management approach to it (including a comprehensive discussion of the known risks, side effects and potential benefits of treatment), the patient (family) agrees to implement the following specific plan:  Recommended dove sensitive soap with warm water showers (not hot showers)  Apply Vaseline ointment on dry areas of body after showering or bathing (right after bathing, do not let skin completely dry before using vaseline)  Use Vaseline ointment multiple times (3+) a day for dry skin  If skin very itchy, apply triamcinolone 0 1% ointment for red itchy areas 2x/day up to 1 week  Do not use on face or groin    Dry skin refers to skin that feels dry to touch  Dry skin has a dull surface with a rough, scaly quality  The skin is less pliable and cracked  When dryness is severe, the skin may become inflamed and fissured  Although any body site can be dry, dry skin tends to affect the shins more than any other site  Dry skin is lacking moisture in the outer horny cell layer (stratum corneum) and this results in cracks in the skin surface  Dry skin is also called xerosis, xeroderma or asteatosis (lack of fat)  It can affect males and females of all ages  There is some racial variability in water and lipid content of the skin    Dry skin that starts in early childhood may be one of about 20 types of ichthyosis (fish-scale skin)  There is often a family history of dry skin  Dry skin is commonly seen in people with atopic dermatitis  Nearly everyone > 60 years has dry skin  Dry skin that begins later may be seen in people with certain diseases and conditions  Postmenopausal women  Hypothyroidism  Chronic renal disease   Malnutrition and weight loss   Subclinical dermatitis   Treatment with certain drugs such as oral retinoids, diuretics and epidermal growth factor receptor inhibitors    People exposed to a dry environment may experience dry skin  Low humidity: in desert climates or cool, windy conditions   Excessive air conditioning   Direct heat from a fire or fan heater   Excessive bathing   Contact with soap, detergents and solvents   Inappropriate topical agents such as alcohol   Frictional irritation from rough clothing or abrasives    Dry skin is due to abnormalities in the integrity of the barrier function of the stratum corneum, which is made up of corneocytes  There is an overall reduction in the lipids in the stratum corneum  Ratio of ceramides, cholesterol and free fatty acids may be normal or altered  There may be a reduction in the proliferation of keratinocytes  Keratinocyte subtypes change in dry skin with a decrease in keratins K1, K10 and increase in K5, K14  Involucrin (a protein) may be expressed early, increasing cell stiffness  The result is retention of corneocytes and reduced water-holding capacity  The inherited forms of ichthyosis are due to loss of function mutations in various genes (listed in parentheses below)  The clinical features of ichthyosis depend on the specific type of ichthyosis:  Ichthyosis vulgaris (FLG)     Recessive X-linked ichthyosis (STS)   Autosomal recessive congenital ichthyosis (ABCA12, TGM1, ALOXE3)   Keratinopathic ichthyoses (KRT1, KRT10, KRT2)    Acquired ichthyosis may be due to:  Metabolic factors: thyroid deficiency Illness: lymphoma, internal malignancy, sarcoidosis, HIV infection   Drugs: nicotinic acid, kava, protein kinase inhibitors (eg EGFR inhibitors), hydroxyurea  Complications of dry skin:  Dry areas of skin may become itchy, indicating a form of eczema/dermatitis has developed  Atopic eczema -- especially in people with ichthyosis vulgaris   Eczema craquelé -- especially in elderly people  Also called asteatotic eczema   A dry form of nummular dermatitis/discoid eczema -- especially in people that wash their skin excessively  When the dry skin of an elderly person is itchy without a visible rash, it is sometimes called winter itch, 7th age itch, senile pruritus or chronic pruritus of the elderly  Other complications of dry skin may include:  Skin infection when bacteria or viruses penetrate a break in the skin surface   Overheating, especially in some forms of ichthyosis   Food allergy, eg, to peanuts, has been associated with filaggrin mutations   Contact allergy, eg, to nickel, has also been correlated with barrier function defects  How is the type of dry skin diagnosed? The type of dry skin is diagnosed by careful history and examination  In children:  Family history   Age of onset   Appearance at birth, if known   Distribution of dry skin   Other features, eg eczema, abnormal nails, hair, dentition, sight, hearing  In adults:  Medical history   Medications and topical preparations   Bathing frequency and use of soap   Evaluation of environmental factors that may contribute to dry skin  What is the treatment for dry skin? The mainstay of treatment of dry skin and ichthyosis is moisturisers/emollients  They should be applied liberally and often enough to:  Reduce itch   Improve the barrier function   Prevent entry of irritants, bacteria   Reduce transepidermal water loss      When considering which emollient is most suitable, consider:  Severity of the dryness   Tolerance   Personal preference Cost and availability  Emollients generally work best if applied to damp skin, if pH is below 7 (acidic), and if containing humectants such as urea or propylene glycol  Additional treatments include:  Topical steroid if itchy or there is dermatitis -- choose an emollient base   Topical calcineurin inhibitors if topical steroids are unsuitable  How can dry skin be prevented? Eliminate aggravating factors:  Reduce the frequency of bathing  A humidifier in winter and air conditioner in summer   Compare having a short shower with a prolonged soak in a bath  Use lukewarm, not hot, water  Replace standard soap with a substitute such as a synthetic detergent cleanser, water-miscible emollient, bath oil, anti-pruritic tar oil, colloidal oatmeal etc    Apply an emollient liberally and often, particularly shortly after bathing, and when itchy  The drier the skin, the thicker this should be, especially on the hands  What is the outlook for dry skin? A tendency to dry skin may persist life-long, or it may improve once contributing factors are controlled

## 2022-11-02 NOTE — PROGRESS NOTES
[facility] Note     Patient Name: Aga Carballo  Encounter Date: 11/02/2022     Have you been cared for by a [facility] [address] [facility] in the last 3 years and, if so, which description applies to you? Yes  I have been here within the last 3 years, and my medical history has NOT changed since that time  I am FEMALE/of child-bearing potential     REVIEW OF SYSTEMS:  Have you recently had or currently have any of the following? · No changes in my recent health  PAST MEDICAL HISTORY:  Have you personally ever had or currently have any of the following? If "YES," then please provide more detail  · No changes in my medical history  FAMILY HISTORY:  Any "first degree relatives" (parent, brother, sister, or child) with the following? • No changes in my family's known health  Allergies   Allergen Reactions   • Adhesive [Medical Tape] Itching   • Cefadroxil Other (See Comments)     Confusion  Can take Ancef  Confusion  Can take Ancef  Current Outpatient Medications:   •  acetaminophen (TYLENOL) 325 mg tablet, Take 650 mg by mouth every 4 (four) hours as needed, Disp: , Rfl:   •  allopurinol (ZYLOPRIM) 100 mg tablet, Take 1 tablet by mouth daily, Disp: , Rfl:   •  amLODIPine (NORVASC) 5 mg tablet, Take 5 mg by mouth daily, Disp: , Rfl:   •  Aspirin Buf,CaCarb-MgCarb-MgO, 81 MG TABS, Take 1 tablet by mouth daily, Disp: , Rfl:   •  atorvastatin (LIPITOR) 80 mg tablet, Take 1 tablet by mouth, Disp: , Rfl:   •  ketoconazole (NIZORAL) 2 % cream, Apply topically 2 (two) times a day to scaly areas on feet and between toes until area clears  , Disp: 15 g, Rfl: 3  •  pentoxifylline (TRENtal) 400 mg ER tablet, Take 1 tablet (400 mg total) by mouth daily, Disp: 90 tablet, Rfl: 0  •  triamcinolone (KENALOG) 0 1 % ointment, Apply topically 2 (two) times a day Apply to affected area on right leg twice daily for up to 14 days at a time   Then take at least a one-week break before resuming for flares  , Disp: 80 g, Rfl: 0            Physical Exam and Assessment/Plan by Diagnosis:           LIPODERMATOSCLEROSIS (MUCH IMPROVED)     Physical Exam:  · Anatomic Location Affected: Ankles to the knees  · Morphological Description:  indurated plaques (much improved, no ulcers)     Additional History of Present Condition: using trental and pentoxifylline       Assessment and Plan:  Based on a thorough discussion of this condition and the management approach to it (including a comprehensive discussion of the known risks, side effects and potential benefits of treatment), the patient (family) agrees to implement the following specific plan:  · Continue with Pentoxifylline 400 mg 2x/day  · Apply triamcinolone 0 1% ointment 2x/day to legs as needed for rash, itch  · Continue compression stockings and elevate  · Follow up 4-5 months      XEROSIS ("DRY SKIN") +/- eczema    Physical Exam:  • Anatomic Location Affected:  bilateral arms   • Morphological Description:  scaly plaque  • Pertinent Positives:  • Pertinent Negatives: Additional History of Present Condition:  patient states itchy all over started a couple weeks ago denies using any topical creams      Assessment and Plan:  Based on a thorough discussion of this condition and the management approach to it (including a comprehensive discussion of the known risks, side effects and potential benefits of treatment), the patient (family) agrees to implement the following specific plan:  • Recommended dove sensitive soap with warm water showers (not hot showers)  • Apply Vaseline ointment on dry areas of body after showering or bathing (right after bathing, do not let skin completely dry before using vaseline)  • Use Vaseline ointment multiple times (3+) a day for dry skin  • If skin very itchy, apply triamcinolone 0 1% ointment for red itchy areas 2x/day up to 1 week  Do not use on face or groin    Dry skin refers to skin that feels dry to touch   Dry skin has a dull surface with a rough, scaly quality  The skin is less pliable and cracked  When dryness is severe, the skin may become inflamed and fissured  Although any body site can be dry, dry skin tends to affect the shins more than any other site  Dry skin is lacking moisture in the outer horny cell layer (stratum corneum) and this results in cracks in the skin surface  Dry skin is also called xerosis, xeroderma or asteatosis (lack of fat)  It can affect males and females of all ages  There is some racial variability in water and lipid content of the skin  • Dry skin that starts in early childhood may be one of about 20 types of ichthyosis (fish-scale skin)  There is often a family history of dry skin  • Dry skin is commonly seen in people with atopic dermatitis  • Nearly everyone > 60 years has dry skin  Dry skin that begins later may be seen in people with certain diseases and conditions  • Postmenopausal women  • Hypothyroidism  • Chronic renal disease   • Malnutrition and weight loss   • Subclinical dermatitis   • Treatment with certain drugs such as oral retinoids, diuretics and epidermal growth factor receptor inhibitors    People exposed to a dry environment may experience dry skin  • Low humidity: in desert climates or cool, windy conditions   • Excessive air conditioning   • Direct heat from a fire or fan heater   • Excessive bathing   • Contact with soap, detergents and solvents   • Inappropriate topical agents such as alcohol   • Frictional irritation from rough clothing or abrasives    Dry skin is due to abnormalities in the integrity of the barrier function of the stratum corneum, which is made up of corneocytes  • There is an overall reduction in the lipids in the stratum corneum  • Ratio of ceramides, cholesterol and free fatty acids may be normal or altered  • There may be a reduction in the proliferation of keratinocytes     • Keratinocyte subtypes change in dry skin with a decrease in keratins K1, K10 and increase in K5, K14    • Involucrin (a protein) may be expressed early, increasing cell stiffness  • The result is retention of corneocytes and reduced water-holding capacity  The inherited forms of ichthyosis are due to loss of function mutations in various genes (listed in parentheses below)  The clinical features of ichthyosis depend on the specific type of ichthyosis:  • Ichthyosis vulgaris (FLG)  • Recessive X-linked ichthyosis (STS)   • Autosomal recessive congenital ichthyosis (ABCA12, TGM1, ALOXE3)   • Keratinopathic ichthyoses (KRT1, KRT10, KRT2)    Acquired ichthyosis may be due to:  • Metabolic factors: thyroid deficiency   • Illness: lymphoma, internal malignancy, sarcoidosis, HIV infection   • Drugs: nicotinic acid, kava, protein kinase inhibitors (eg EGFR inhibitors), hydroxyurea  Complications of dry skin:  Dry areas of skin may become itchy, indicating a form of eczema/dermatitis has developed  • Atopic eczema -- especially in people with ichthyosis vulgaris   • Eczema craquelé -- especially in elderly people  Also called asteatotic eczema   • A dry form of nummular dermatitis/discoid eczema -- especially in people that wash their skin excessively  When the dry skin of an elderly person is itchy without a visible rash, it is sometimes called winter itch, 7th age itch, senile pruritus or chronic pruritus of the elderly  Other complications of dry skin may include:  • Skin infection when bacteria or viruses penetrate a break in the skin surface   • Overheating, especially in some forms of ichthyosis   • Food allergy, eg, to peanuts, has been associated with filaggrin mutations   • Contact allergy, eg, to nickel, has also been correlated with barrier function defects  How is the type of dry skin diagnosed? The type of dry skin is diagnosed by careful history and examination      In children:  • Family history   • Age of onset   • Appearance at birth, if known • Distribution of dry skin   • Other features, eg eczema, abnormal nails, hair, dentition, sight, hearing  In adults:  • Medical history   • Medications and topical preparations   • Bathing frequency and use of soap   • Evaluation of environmental factors that may contribute to dry skin  What is the treatment for dry skin? The mainstay of treatment of dry skin and ichthyosis is moisturisers/emollients  They should be applied liberally and often enough to:  • Reduce itch   • Improve the barrier function   • Prevent entry of irritants, bacteria   • Reduce transepidermal water loss  When considering which emollient is most suitable, consider:  • Severity of the dryness   • Tolerance   • Personal preference   • Cost and availability  Emollients generally work best if applied to damp skin, if pH is below 7 (acidic), and if containing humectants such as urea or propylene glycol  Additional treatments include:  • Topical steroid if itchy or there is dermatitis -- choose an emollient base   • Topical calcineurin inhibitors if topical steroids are unsuitable  How can dry skin be prevented? Eliminate aggravating factors:  • Reduce the frequency of bathing  • A humidifier in winter and air conditioner in summer   • Compare having a short shower with a prolonged soak in a bath  • Use lukewarm, not hot, water  • Replace standard soap with a substitute such as a synthetic detergent cleanser, water-miscible emollient, bath oil, anti-pruritic tar oil, colloidal oatmeal etc    • Apply an emollient liberally and often, particularly shortly after bathing, and when itchy  The drier the skin, the thicker this should be, especially on the hands  What is the outlook for dry skin? A tendency to dry skin may persist life-long, or it may improve once contributing factors are controlled      Scribe Attestation    I,:  Victor Hugo Mehta am acting as a scribe while in the presence of the attending physician : I,:  Leatha Mcclain MD personally performed the services described in this documentation    as scribed in my presence :

## 2023-02-22 ENCOUNTER — TELEPHONE (OUTPATIENT)
Dept: DERMATOLOGY | Facility: CLINIC | Age: 73
End: 2023-02-22

## 2023-02-22 ENCOUNTER — OFFICE VISIT (OUTPATIENT)
Dept: DERMATOLOGY | Facility: CLINIC | Age: 73
End: 2023-02-22

## 2023-02-22 VITALS — TEMPERATURE: 96.9 F

## 2023-02-22 DIAGNOSIS — L85.3 XEROSIS OF SKIN: Primary | ICD-10-CM

## 2023-02-22 RX ORDER — PIMECROLIMUS 10 MG/G
CREAM TOPICAL
Qty: 100 G | Refills: 3 | Status: SHIPPED | OUTPATIENT
Start: 2023-02-22

## 2023-02-22 NOTE — PROGRESS NOTES
[facility]     Patient Name: Agueda Robles  Encounter Date: 2/22/23     Have you been cared for by a [facility] Dermatologist in the last 3 years and, if so, which description applies to you? Yes  I have been here within the last 3 years, and my medical history has NOT changed since that time  I am FEMALE/of child-bearing potential     REVIEW OF SYSTEMS:  Have you recently had or currently have any of the following? · No changes in my recent health  PAST MEDICAL HISTORY:  Have you personally ever had or currently have any of the following? If "YES," then please provide more detail  · No changes in my medical history  FAMILY HISTORY:  Any "first degree relatives" (parent, brother, sister, or child) with the following? • No changes in my family's known health  PATIENT EXPERIENCE:    • Do you want the Dermatologist to perform a COMPLETE skin exam today including a clinical examination under the "bra and underwear" areas? NO  • If necessary, do we have your permission to call and leave a detailed message on your Preferred Phone number that includes your specific medical information? Yes      Allergies   Allergen Reactions   • Adhesive [Medical Tape] Itching   • Cefadroxil Other (See Comments)     Confusion  Can take Ancef  Confusion  Can take Ancef  Current Outpatient Medications:   •  acetaminophen (TYLENOL) 325 mg tablet, Take 650 mg by mouth every 4 (four) hours as needed, Disp: , Rfl:   •  allopurinol (ZYLOPRIM) 100 mg tablet, Take 1 tablet by mouth daily, Disp: , Rfl:   •  amLODIPine (NORVASC) 5 mg tablet, Take 5 mg by mouth daily, Disp: , Rfl:   •  Aspirin Buf,CaCarb-MgCarb-MgO, 81 MG TABS, Take 1 tablet by mouth daily, Disp: , Rfl:   •  atorvastatin (LIPITOR) 80 mg tablet, Take 1 tablet by mouth, Disp: , Rfl:   •  ketoconazole (NIZORAL) 2 % cream, Apply topically 2 (two) times a day to scaly areas on feet and between toes until area clears  , Disp: 15 g, Rfl: 3  • pentoxifylline (TRENtal) 400 mg ER tablet, Take 1 tablet (400 mg total) by mouth 2 (two) times a day, Disp: 90 tablet, Rfl: 0  •  triamcinolone (KENALOG) 0 1 % ointment, Apply topically 2x/day to to red itchy areas 2x/day up to 1 week  Do not use on face or groin, Disp: 454 g, Rfl: 3          • Whom besides the patient is providing clinical information about today's encounter?   o NO ADDITIONAL HISTORIAN (patient alone provided history)    Physical Exam and Assessment/Plan by Diagnosis:    XEROSIS ("DRY SKIN") + eczema     Physical Exam:  • Anatomic Location Affected:  bilateral arms, face, upper thighs  • Morphological Description:  xerosis (no actual lesions)  • Pertinent Positives:  • Pertinent Negatives:     Additional History of Present Condition:  patient states itchy all over started a couple weeks ago denies using any topical creams       Assessment and Plan:  Based on a thorough discussion of this condition and the management approach to it (including a comprehensive discussion of the known risks, side effects and potential benefits of treatment), the patient (family) agrees to implement the following specific plan:  • Recommended dove sensitive soap with warm water showers (not hot showers)  • Apply Vaseline ointment on dry areas of body after showering or bathing (right after bathing, do not let skin completely dry before using vaseline)  • Use Vaseline ointment multiple times (3+) a day for dry skin  • If skin very itchy, apply triamcinolone 0 1% ointment for red itchy areas 2x/day up to 1 week  Do not use on face or groin  • Apply pimecrolimus 1% cream daily to face as needed for itch  • Recommended using gentle soaps such as Dove for sensitive skin   • Patient reports rash  DID NOT START after medication, patient also reports kidney disease      Dry skin refers to skin that feels dry to touch  Dry skin has a dull surface with a rough, scaly quality  The skin is less pliable and cracked   When dryness is severe, the skin may become inflamed and fissured  Although any body site can be dry, dry skin tends to affect the shins more than any other site      Dry skin is lacking moisture in the outer horny cell layer (stratum corneum) and this results in cracks in the skin surface  Dry skin is also called xerosis, xeroderma or asteatosis (lack of fat)  It can affect males and females of all ages  There is some racial variability in water and lipid content of the skin  • Dry skin that starts in early childhood may be one of about 20 types of ichthyosis (fish-scale skin)  There is often a family history of dry skin  • Dry skin is commonly seen in people with atopic dermatitis  • Nearly everyone > 60 years has dry skin      Dry skin that begins later may be seen in people with certain diseases and conditions  • Postmenopausal women  • Hypothyroidism  • Chronic renal disease   • Malnutrition and weight loss   • Subclinical dermatitis   • Treatment with certain drugs such as oral retinoids, diuretics and epidermal growth factor receptor inhibitors     People exposed to a dry environment may experience dry skin  • Low humidity: in desert climates or cool, windy conditions   • Excessive air conditioning   • Direct heat from a fire or fan heater   • Excessive bathing   • Contact with soap, detergents and solvents   • Inappropriate topical agents such as alcohol   • Frictional irritation from rough clothing or abrasives     Dry skin is due to abnormalities in the integrity of the barrier function of the stratum corneum, which is made up of corneocytes  • There is an overall reduction in the lipids in the stratum corneum  • Ratio of ceramides, cholesterol and free fatty acids may be normal or altered  • There may be a reduction in the proliferation of keratinocytes     • Keratinocyte subtypes change in dry skin with a decrease in keratins K1, K10 and increase in K5, K14    • Involucrin (a protein) may be expressed early, increasing cell stiffness  • The result is retention of corneocytes and reduced water-holding capacity      The inherited forms of ichthyosis are due to loss of function mutations in various genes (listed in parentheses below)  The clinical features of ichthyosis depend on the specific type of ichthyosis:  • Ichthyosis vulgaris (FLG)  • Recessive X-linked ichthyosis (STS)   • Autosomal recessive congenital ichthyosis (ABCA12, TGM1, ALOXE3)   • Keratinopathic ichthyoses (KRT1, KRT10, KRT2)     Acquired ichthyosis may be due to:  • Metabolic factors: thyroid deficiency   • Illness: lymphoma, internal malignancy, sarcoidosis, HIV infection   • Drugs: nicotinic acid, kava, protein kinase inhibitors (eg EGFR inhibitors), hydroxyurea      Complications of dry skin:  Dry areas of skin may become itchy, indicating a form of eczema/dermatitis has developed  • Atopic eczema -- especially in people with ichthyosis vulgaris   • Eczema craquelé -- especially in elderly people  Also called asteatotic eczema   • A dry form of nummular dermatitis/discoid eczema -- especially in people that wash their skin excessively  When the dry skin of an elderly person is itchy without a visible rash, it is sometimes called winter itch, 7th age itch, senile pruritus or chronic pruritus of the elderly      Other complications of dry skin may include:  • Skin infection when bacteria or viruses penetrate a break in the skin surface   • Overheating, especially in some forms of ichthyosis   • Food allergy, eg, to peanuts, has been associated with filaggrin mutations   • Contact allergy, eg, to nickel, has also been correlated with barrier function defects      How is the type of dry skin diagnosed?   The type of dry skin is diagnosed by careful history and examination      In children:  • Family history   • Age of onset   • Appearance at birth, if known   • Distribution of dry skin   • Other features, eg eczema, abnormal nails, hair, dentition, sight, hearing      In adults:  • Medical history   • Medications and topical preparations   • Bathing frequency and use of soap   • Evaluation of environmental factors that may contribute to dry skin      What is the treatment for dry skin? The mainstay of treatment of dry skin and ichthyosis is moisturisers/emollients  They should be applied liberally and often enough to:  • Reduce itch   • Improve the barrier function   • Prevent entry of irritants, bacteria   • Reduce transepidermal water loss      When considering which emollient is most suitable, consider:  • Severity of the dryness   • Tolerance   • Personal preference   • Cost and availability      Emollients generally work best if applied to damp skin, if pH is below 7 (acidic), and if containing humectants such as urea or propylene glycol  Additional treatments include:  • Topical steroid if itchy or there is dermatitis -- choose an emollient base   • Topical calcineurin inhibitors if topical steroids are unsuitable      How can dry skin be prevented? Eliminate aggravating factors:  • Reduce the frequency of bathing  • A humidifier in winter and air conditioner in summer   • Compare having a short shower with a prolonged soak in a bath  • Use lukewarm, not hot, water  • Replace standard soap with a substitute such as a synthetic detergent cleanser, water-miscible emollient, bath oil, anti-pruritic tar oil, colloidal oatmeal etc    • Apply an emollient liberally and often, particularly shortly after bathing, and when itchy  The drier the skin, the thicker this should be, especially on the hands      What is the outlook for dry skin? A tendency to dry skin may persist life-long, or it may improve once contributing factors are controlled      Scribe Attestation    I,:  Augustus Ellison am acting as a scribe while in the presence of the attending physician :       I,:  Sandeep Del Toro MD personally performed the services described in this documentation    as scribed in my presence :

## 2023-02-22 NOTE — PATIENT INSTRUCTIONS
XEROSIS ("DRY SKIN") + eczema      Assessment and Plan:  Based on a thorough discussion of this condition and the management approach to it (including a comprehensive discussion of the known risks, side effects and potential benefits of treatment), the patient (family) agrees to implement the following specific plan:  Recommended dove sensitive soap with warm water showers (not hot showers)  Apply Vaseline ointment on dry areas of body after showering or bathing (right after bathing, do not let skin completely dry before using vaseline)  Use Vaseline ointment multiple times (3+) a day for dry skin  If skin very itchy, apply triamcinolone 0 1% ointment for red itchy areas 2x/day up to 1 week  Do not use on face or groin  Apply pimecrolimus 1$ cream daily to face as needed for itch  Recommended using gentle soaps such as Dove for sensitive skin   Patient reports rash started after medication, patient also reports kidney disease      Dry skin refers to skin that feels dry to touch  Dry skin has a dull surface with a rough, scaly quality  The skin is less pliable and cracked  When dryness is severe, the skin may become inflamed and fissured  Although any body site can be dry, dry skin tends to affect the shins more than any other site  Dry skin is lacking moisture in the outer horny cell layer (stratum corneum) and this results in cracks in the skin surface  Dry skin is also called xerosis, xeroderma or asteatosis (lack of fat)  It can affect males and females of all ages  There is some racial variability in water and lipid content of the skin  Dry skin that starts in early childhood may be one of about 20 types of ichthyosis (fish-scale skin)  There is often a family history of dry skin  Dry skin is commonly seen in people with atopic dermatitis  Nearly everyone > 60 years has dry skin  Dry skin that begins later may be seen in people with certain diseases and conditions    Postmenopausal women  Hypothyroidism  Chronic renal disease   Malnutrition and weight loss   Subclinical dermatitis   Treatment with certain drugs such as oral retinoids, diuretics and epidermal growth factor receptor inhibitors     People exposed to a dry environment may experience dry skin  Low humidity: in desert climates or cool, windy conditions   Excessive air conditioning   Direct heat from a fire or fan heater   Excessive bathing   Contact with soap, detergents and solvents   Inappropriate topical agents such as alcohol   Frictional irritation from rough clothing or abrasives     Dry skin is due to abnormalities in the integrity of the barrier function of the stratum corneum, which is made up of corneocytes  There is an overall reduction in the lipids in the stratum corneum  Ratio of ceramides, cholesterol and free fatty acids may be normal or altered  There may be a reduction in the proliferation of keratinocytes  Keratinocyte subtypes change in dry skin with a decrease in keratins K1, K10 and increase in K5, K14  Involucrin (a protein) may be expressed early, increasing cell stiffness  The result is retention of corneocytes and reduced water-holding capacity  The inherited forms of ichthyosis are due to loss of function mutations in various genes (listed in parentheses below)  The clinical features of ichthyosis depend on the specific type of ichthyosis:  Ichthyosis vulgaris (FLG)  Recessive X-linked ichthyosis (STS)   Autosomal recessive congenital ichthyosis (ABCA12, TGM1, ALOXE3)   Keratinopathic ichthyoses (KRT1, KRT10, KRT2)     Acquired ichthyosis may be due to:  Metabolic factors: thyroid deficiency   Illness: lymphoma, internal malignancy, sarcoidosis, HIV infection   Drugs: nicotinic acid, kava, protein kinase inhibitors (eg EGFR inhibitors), hydroxyurea  Complications of dry skin:  Dry areas of skin may become itchy, indicating a form of eczema/dermatitis has developed    Atopic eczema -- especially in people with ichthyosis vulgaris   Eczema craquelé -- especially in elderly people  Also called asteatotic eczema   A dry form of nummular dermatitis/discoid eczema -- especially in people that wash their skin excessively  When the dry skin of an elderly person is itchy without a visible rash, it is sometimes called winter itch, 7th age itch, senile pruritus or chronic pruritus of the elderly  Other complications of dry skin may include:  Skin infection when bacteria or viruses penetrate a break in the skin surface   Overheating, especially in some forms of ichthyosis   Food allergy, eg, to peanuts, has been associated with filaggrin mutations   Contact allergy, eg, to nickel, has also been correlated with barrier function defects  How is the type of dry skin diagnosed? The type of dry skin is diagnosed by careful history and examination  In children:  Family history   Age of onset   Appearance at birth, if known   Distribution of dry skin   Other features, eg eczema, abnormal nails, hair, dentition, sight, hearing  In adults:  Medical history   Medications and topical preparations   Bathing frequency and use of soap   Evaluation of environmental factors that may contribute to dry skin  What is the treatment for dry skin? The mainstay of treatment of dry skin and ichthyosis is moisturisers/emollients  They should be applied liberally and often enough to:  Reduce itch   Improve the barrier function   Prevent entry of irritants, bacteria   Reduce transepidermal water loss  When considering which emollient is most suitable, consider:  Severity of the dryness   Tolerance   Personal preference   Cost and availability  Emollients generally work best if applied to damp skin, if pH is below 7 (acidic), and if containing humectants such as urea or propylene glycol    Additional treatments include:  Topical steroid if itchy or there is dermatitis -- choose an emollient base   Topical calcineurin inhibitors if topical steroids are unsuitable  How can dry skin be prevented? Eliminate aggravating factors:  Reduce the frequency of bathing  A humidifier in winter and air conditioner in summer   Compare having a short shower with a prolonged soak in a bath  Use lukewarm, not hot, water  Replace standard soap with a substitute such as a synthetic detergent cleanser, water-miscible emollient, bath oil, anti-pruritic tar oil, colloidal oatmeal etc    Apply an emollient liberally and often, particularly shortly after bathing, and when itchy  The drier the skin, the thicker this should be, especially on the hands  What is the outlook for dry skin? A tendency to dry skin may persist life-long, or it may improve once contributing factors are controlled

## 2023-09-12 ENCOUNTER — TELEPHONE (OUTPATIENT)
Dept: DERMATOLOGY | Facility: CLINIC | Age: 73
End: 2023-09-12

## 2023-09-12 NOTE — TELEPHONE ENCOUNTER
Called and verified with Bam that Pt does have [employer] as secondary and medicare part A & B as primary

## 2023-09-13 ENCOUNTER — OFFICE VISIT (OUTPATIENT)
Dept: DERMATOLOGY | Facility: CLINIC | Age: 73
End: 2023-09-13
Payer: MEDICARE

## 2023-09-13 VITALS — BODY MASS INDEX: 41.99 KG/M2 | TEMPERATURE: 97.8 F | HEIGHT: 66 IN | HEART RATE: 70 BPM | RESPIRATION RATE: 20 BRPM

## 2023-09-13 DIAGNOSIS — L85.3 XEROSIS OF SKIN: ICD-10-CM

## 2023-09-13 PROCEDURE — 99214 OFFICE O/P EST MOD 30 MIN: CPT | Performed by: STUDENT IN AN ORGANIZED HEALTH CARE EDUCATION/TRAINING PROGRAM

## 2023-09-13 RX ORDER — TRIAMCINOLONE ACETONIDE 1 MG/G
OINTMENT TOPICAL 2 TIMES DAILY
Qty: 454 G | Refills: 3 | Status: SHIPPED | OUTPATIENT
Start: 2023-09-13

## 2023-09-13 RX ORDER — PIMECROLIMUS 10 MG/G
CREAM TOPICAL
Qty: 100 G | Refills: 3 | Status: SHIPPED | OUTPATIENT
Start: 2023-09-13

## 2023-09-13 NOTE — PROGRESS NOTES
[facility] Note     Patient Name: Servando Niño  Encounter Date: 9/13/2023    Have you been cared for by a [facility]ologist in the last 3 years and, if so, which description applies to you? Yes. I have been here within the last 3 years, and my medical history has NOT changed since that time. I am FEMALE/of child-bearing potential.    REVIEW OF SYSTEMS:  Have you recently had or currently have any of the following? · No changes in my recent health. PAST MEDICAL HISTORY:  Have you personally ever had or currently have any of the following? If "YES," then please provide more detail. · No changes in my medical history. FAMILY HISTORY:  Any "first degree relatives" (parent, brother, sister, or child) with the following? • No changes in my family's known health. PATIENT EXPERIENCE:    • Do you want the Dermatologist to perform a COMPLETE skin exam today including a clinical examination under the "bra and underwear" areas? NO  • If necessary, do we have your permission to call and leave a detailed message on your Preferred Phone number that includes your specific medical information? Yes      Allergies   Allergen Reactions   • Adhesive [Medical Tape] Itching   • Cefadroxil Other (See Comments)     Confusion. Can take Ancef. Confusion. Can take Ancef. Current Outpatient Medications:   •  acetaminophen (TYLENOL) 325 mg tablet, Take 650 mg by mouth every 4 (four) hours as needed, Disp: , Rfl:   •  allopurinol (ZYLOPRIM) 100 mg tablet, Take 1 tablet by mouth daily, Disp: , Rfl:   •  amLODIPine (NORVASC) 5 mg tablet, Take 5 mg by mouth daily, Disp: , Rfl:   •  Aspirin Buf,CaCarb-MgCarb-MgO, 81 MG TABS, Take 1 tablet by mouth daily, Disp: , Rfl:   •  atorvastatin (LIPITOR) 80 mg tablet, Take 1 tablet by mouth, Disp: , Rfl:   •  ketoconazole (NIZORAL) 2 % cream, Apply topically 2 (two) times a day to scaly areas on feet and between toes until area clears. , Disp: 15 g, Rfl: 3  •  pentoxifylline (TRENtal) 400 mg ER tablet, Take 1 tablet (400 mg total) by mouth 2 (two) times a day, Disp: 90 tablet, Rfl: 0  •  pimecrolimus (ELIDEL) 1 % cream, Apply topically twice daily to face as needed, Disp: 100 g, Rfl: 3  •  triamcinolone (KENALOG) 0.1 % ointment, Apply topically 2x/day to to red itchy areas 2x/day up to 1 week. Do not use on face or groin, Disp: 454 g, Rfl: 3  •  triamcinolone (KENALOG) 0.1 % ointment, Apply topically 2 (two) times a day Do not apply to face, axilla or groin areas. Do not use for more than 1 week at a time, Disp: 454 g, Rfl: 3          • Whom besides the patient is providing clinical information about today's encounter?   o NO ADDITIONAL HISTORIAN (patient alone provided history)    Physical Exam and Assessment/Plan by Diagnosis:        XEROSIS ("DRY SKIN") AND PRURITUS OF THE ELDERLY    Physical Exam:  • Anatomic Location Affected:  diffuse  • Morphological Description:  Xerosis   • Pertinent Positives:  • Pertinent Negatives: Additional History of Present Condition:  The patient present to the office for a follow up from appointment on 2/22/2023; Diagnosed with xerosis of skin. Assessment and Plan:  Based on a thorough discussion of this condition and the management approach to it (including a comprehensive discussion of the known risks, side effects and potential benefits of treatment), the patient (family) agrees to implement the following specific plan:  • Recommended dove sensitive soap with warm water showers (not hot showers)  • Apply Vaseline ointment on dry areas of body after showering or bathing (right after bathing, do not let skin completely dry before using vaseline)  • Use Vaseline ointment multiple times (3+) a day for dry skin  • If skin very itchy, apply triamcinolone 0.1% ointment for red itchy areas twice a day up to 1 week. Do not use on face or groin.  Overuse can thin skin (Renewed)  • Apply pimecrolimus 1% cream daily to face as needed for itch. (Renewed)   • Recommended using gentle soaps such as Dove for sensitive skin. • Follow up as needed    Dry skin refers to skin that feels dry to touch. Dry skin has a dull surface with a rough, scaly quality. The skin is less pliable and cracked. When dryness is severe, the skin may become inflamed and fissured. Although any body site can be dry, dry skin tends to affect the shins more than any other site. Dry skin is lacking moisture in the outer horny cell layer (stratum corneum) and this results in cracks in the skin surface. Dry skin is also called xerosis, xeroderma or asteatosis (lack of fat). It can affect males and females of all ages. There is some racial variability in water and lipid content of the skin. • Dry skin that starts in early childhood may be one of about 20 types of ichthyosis (fish-scale skin). There is often a family history of dry skin. • Dry skin is commonly seen in people with atopic dermatitis. • Nearly everyone > 60 years has dry skin. Dry skin that begins later may be seen in people with certain diseases and conditions. • Postmenopausal women  • Hypothyroidism  • Chronic renal disease   • Malnutrition and weight loss   • Subclinical dermatitis   • Treatment with certain drugs such as oral retinoids, diuretics and epidermal growth factor receptor inhibitors    People exposed to a dry environment may experience dry skin. • Low humidity: in desert climates or cool, windy conditions   • Excessive air conditioning   • Direct heat from a fire or fan heater   • Excessive bathing   • Contact with soap, detergents and solvents   • Inappropriate topical agents such as alcohol   • Frictional irritation from rough clothing or abrasives    Dry skin is due to abnormalities in the integrity of the barrier function of the stratum corneum, which is made up of corneocytes. • There is an overall reduction in the lipids in the stratum corneum.    • Ratio of ceramides, cholesterol and free fatty acids may be normal or altered. • There may be a reduction in the proliferation of keratinocytes. • Keratinocyte subtypes change in dry skin with a decrease in keratins K1, K10 and increase in K5, K14.   • Involucrin (a protein) may be expressed early, increasing cell stiffness. • The result is retention of corneocytes and reduced water-holding capacity. The inherited forms of ichthyosis are due to loss of function mutations in various genes (listed in parentheses below). The clinical features of ichthyosis depend on the specific type of ichthyosis:  • Ichthyosis vulgaris (FLG). • Recessive X-linked ichthyosis (STS)   • Autosomal recessive congenital ichthyosis (ABCA12, TGM1, ALOXE3)   • Keratinopathic ichthyoses (KRT1, KRT10, KRT2)    Acquired ichthyosis may be due to:  • Metabolic factors: thyroid deficiency   • Illness: lymphoma, internal malignancy, sarcoidosis, HIV infection   • Drugs: nicotinic acid, kava, protein kinase inhibitors (eg EGFR inhibitors), hydroxyurea. Complications of dry skin:  Dry areas of skin may become itchy, indicating a form of eczema/dermatitis has developed. • Atopic eczema -- especially in people with ichthyosis vulgaris   • Eczema craquelé -- especially in elderly people. Also called asteatotic eczema   • A dry form of nummular dermatitis/discoid eczema -- especially in people that wash their skin excessively. When the dry skin of an elderly person is itchy without a visible rash, it is sometimes called winter itch, 7th age itch, senile pruritus or chronic pruritus of the elderly. Other complications of dry skin may include:  • Skin infection when bacteria or viruses penetrate a break in the skin surface   • Overheating, especially in some forms of ichthyosis   • Food allergy, eg, to peanuts, has been associated with filaggrin mutations   • Contact allergy, eg, to nickel, has also been correlated with barrier function defects.     How is the type of dry skin diagnosed? The type of dry skin is diagnosed by careful history and examination. In children:  • Family history   • Age of onset   • Appearance at birth, if known   • Distribution of dry skin   • Other features, eg eczema, abnormal nails, hair, dentition, sight, hearing. In adults:  • Medical history   • Medications and topical preparations   • Bathing frequency and use of soap   • Evaluation of environmental factors that may contribute to dry skin. What is the treatment for dry skin? The mainstay of treatment of dry skin and ichthyosis is moisturisers/emollients. They should be applied liberally and often enough to:  • Reduce itch   • Improve the barrier function   • Prevent entry of irritants, bacteria   • Reduce transepidermal water loss. When considering which emollient is most suitable, consider:  • Severity of the dryness   • Tolerance   • Personal preference   • Cost and availability. Emollients generally work best if applied to damp skin, if pH is below 7 (acidic), and if containing humectants such as urea or propylene glycol. Additional treatments include:  • Topical steroid if itchy or there is dermatitis -- choose an emollient base   • Topical calcineurin inhibitors if topical steroids are unsuitable. How can dry skin be prevented? Eliminate aggravating factors:  • Reduce the frequency of bathing. • A humidifier in winter and air conditioner in summer   • Compare having a short shower with a prolonged soak in a bath. • Use lukewarm, not hot, water. • Replace standard soap with a substitute such as a synthetic detergent cleanser, water-miscible emollient, bath oil, anti-pruritic tar oil, colloidal oatmeal etc.   • Apply an emollient liberally and often, particularly shortly after bathing, and when itchy. The drier the skin, the thicker this should be, especially on the hands. What is the outlook for dry skin?   A tendency to dry skin may persist life-long, or it may improve once contributing factors are controlled.     Scribe Attestation    I,:  Ramandeep Summers am acting as a scribe while in the presence of the attending physician.:       I,:  Gavino Robbins MD personally performed the services described in this documentation    as scribed in my presence.:

## 2023-09-13 NOTE — PATIENT INSTRUCTIONS
XEROSIS ("DRY SKIN")     Assessment and Plan:  Based on a thorough discussion of this condition and the management approach to it (including a comprehensive discussion of the known risks, side effects and potential benefits of treatment), the patient (family) agrees to implement the following specific plan:  Recommended dove sensitive soap with warm water showers (not hot showers)  Apply Vaseline ointment on dry areas of body after showering or bathing (right after bathing, do not let skin completely dry before using vaseline)  Use Vaseline ointment multiple times (3+) a day for dry skin  If skin very itchy, apply triamcinolone 0.1% ointment for red itchy areas twice a day up to 1 week. Do not use on face or groin. (Renewed)  Apply pimecrolimus 1% cream daily to face as needed for itch. (Renewed)   Recommended using gentle soaps such as Dove for sensitive skin.   Follow up as needed

## 2024-02-02 LAB — HBA1C MFR BLD HPLC: 5.3 %
